# Patient Record
Sex: FEMALE | Race: BLACK OR AFRICAN AMERICAN
[De-identification: names, ages, dates, MRNs, and addresses within clinical notes are randomized per-mention and may not be internally consistent; named-entity substitution may affect disease eponyms.]

---

## 2021-04-03 ENCOUNTER — HOSPITAL ENCOUNTER (EMERGENCY)
Dept: HOSPITAL 75 - ER | Age: 36
Discharge: LEFT BEFORE DISCHARGE | End: 2021-04-03
Payer: SELF-PAY

## 2021-04-03 VITALS — DIASTOLIC BLOOD PRESSURE: 96 MMHG | SYSTOLIC BLOOD PRESSURE: 139 MMHG

## 2021-04-03 VITALS — WEIGHT: 199.96 LBS | HEIGHT: 70.98 IN | BODY MASS INDEX: 27.99 KG/M2

## 2021-04-03 DIAGNOSIS — F12.10: ICD-10-CM

## 2021-04-03 DIAGNOSIS — S93.402A: Primary | ICD-10-CM

## 2021-04-03 DIAGNOSIS — F15.10: ICD-10-CM

## 2021-04-03 DIAGNOSIS — F17.200: ICD-10-CM
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
## 2021-04-03 LAB
APTT PPP: (no result) S
BACTERIA #/AREA URNS HPF: (no result) /HPF
BARBITURATES UR QL: NEGATIVE
BENZODIAZ UR QL SCN: NEGATIVE
BILIRUB UR QL STRIP: (no result)
COCAINE UR QL: NEGATIVE
FIBRINOGEN PPP-MCNC: (no result) MG/DL
GLUCOSE UR STRIP-MCNC: NEGATIVE MG/DL
KETONES UR QL STRIP: (no result)
LEUKOCYTE ESTERASE UR QL STRIP: (no result)
METHADONE UR QL SCN: NEGATIVE
METHAMPHETAMINE SCREEN URINE S: POSITIVE
NITRITE UR QL STRIP: NEGATIVE
OPIATES UR QL SCN: NEGATIVE
OXYCODONE UR QL: NEGATIVE
PH UR STRIP: 5.5 [PH] (ref 5–9)
PROPOXYPH UR QL: NEGATIVE
PROT UR QL STRIP: (no result)
RBC #/AREA URNS HPF: (no result) /HPF
SP GR UR STRIP: >=1.03 (ref 1.02–1.02)
SQUAMOUS #/AREA URNS HPF: (no result) /HPF
TRICYCLICS UR QL SCN: NEGATIVE
WBC #/AREA URNS HPF: (no result) /HPF

## 2021-04-03 PROCEDURE — 73610 X-RAY EXAM OF ANKLE: CPT

## 2021-04-03 PROCEDURE — 80306 DRUG TEST PRSMV INSTRMNT: CPT

## 2021-04-03 PROCEDURE — 81000 URINALYSIS NONAUTO W/SCOPE: CPT

## 2021-04-03 PROCEDURE — 99283 EMERGENCY DEPT VISIT LOW MDM: CPT

## 2021-04-03 PROCEDURE — 84703 CHORIONIC GONADOTROPIN ASSAY: CPT

## 2021-04-03 NOTE — DIAGNOSTIC IMAGING REPORT
HISTORY: Left ankle pain



COMPARISON: None



TECHNIQUE: 3 views of the left ankle



FINDINGS:



No acute fracture or dislocation is seen in the left ankle.

Alignment is normal. The ankle mortise is symmetric and the talar

dome is intact. No joint effusion is seen. Tiny hyperdensity is

seen posterolateral to the left ankle and appears to be external

to the patient.



IMPRESSION:

1. No acute osseous abnormality is seen in the left ankle.



Dictated by: 



  Dictated on workstation # [id number]
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
